# Patient Record
Sex: FEMALE | Race: WHITE | NOT HISPANIC OR LATINO | Employment: FULL TIME | URBAN - METROPOLITAN AREA
[De-identification: names, ages, dates, MRNs, and addresses within clinical notes are randomized per-mention and may not be internally consistent; named-entity substitution may affect disease eponyms.]

---

## 2019-08-16 ENCOUNTER — OFFICE VISIT (OUTPATIENT)
Dept: FAMILY MEDICINE CLINIC | Facility: CLINIC | Age: 59
End: 2019-08-16

## 2019-08-16 VITALS
BODY MASS INDEX: 26.12 KG/M2 | TEMPERATURE: 97.5 F | HEART RATE: 65 BPM | WEIGHT: 156.8 LBS | DIASTOLIC BLOOD PRESSURE: 56 MMHG | SYSTOLIC BLOOD PRESSURE: 108 MMHG | HEIGHT: 65 IN | OXYGEN SATURATION: 98 %

## 2019-08-16 DIAGNOSIS — H66.92 LEFT OTITIS MEDIA, UNSPECIFIED OTITIS MEDIA TYPE: ICD-10-CM

## 2019-08-16 DIAGNOSIS — H61.23 BILATERAL IMPACTED CERUMEN: Primary | ICD-10-CM

## 2019-08-16 PROCEDURE — 99213 OFFICE O/P EST LOW 20 MIN: CPT | Performed by: NURSE PRACTITIONER

## 2019-08-16 PROCEDURE — 69209 REMOVE IMPACTED EAR WAX UNI: CPT | Performed by: NURSE PRACTITIONER

## 2019-08-16 RX ORDER — AZITHROMYCIN 250 MG/1
TABLET, FILM COATED ORAL
Qty: 5 TABLET | Refills: 0 | Status: SHIPPED | OUTPATIENT
Start: 2019-08-16

## 2019-08-16 NOTE — PATIENT INSTRUCTIONS
Earwax Buildup, Adult  The ears produce a substance called earwax that helps keep bacteria out of the ear and protects the skin in the ear canal. Occasionally, earwax can build up in the ear and cause discomfort or hearing loss.  What increases the risk?  This condition is more likely to develop in people who:  · Are male.  · Are elderly.  · Naturally produce more earwax.  · Clean their ears often with cotton swabs.  · Use earplugs often.  · Use in-ear headphones often.  · Wear hearing aids.  · Have narrow ear canals.  · Have earwax that is overly thick or sticky.  · Have eczema.  · Are dehydrated.  · Have excess hair in the ear canal.  What are the signs or symptoms?  Symptoms of this condition include:  · Reduced or muffled hearing.  · A feeling of fullness in the ear or feeling that the ear is plugged.  · Fluid coming from the ear.  · Ear pain.  · Ear itch.  · Ringing in the ear.  · Coughing.  · An obvious piece of earwax that can be seen inside the ear canal.  How is this diagnosed?  This condition may be diagnosed based on:  · Your symptoms.  · Your medical history.  · An ear exam. During the exam, your health care provider will look into your ear with an instrument called an otoscope.  You may have tests, including a hearing test.  How is this treated?  This condition may be treated by:  · Using ear drops to soften the earwax.  · Having the earwax removed by a health care provider. The health care provider may:  ? Flush the ear with water.  ? Use an instrument that has a loop on the end (curette).  ? Use a suction device.  · Surgery to remove the wax buildup. This may be done in severe cases.  Follow these instructions at home:    · Take over-the-counter and prescription medicines only as told by your health care provider.  · Do not put any objects, including cotton swabs, into your ear. You can clean the opening of your ear canal with a washcloth or facial tissue.  · Follow instructions from your health care  provider about cleaning your ears. Do not over-clean your ears.  · Drink enough fluid to keep your urine clear or pale yellow. This will help to thin the earwax.  · Keep all follow-up visits as told by your health care provider. If earwax builds up in your ears often or if you use hearing aids, consider seeing your health care provider for routine, preventive ear cleanings. Ask your health care provider how often you should schedule your cleanings.  · If you have hearing aids, clean them according to instructions from the  and your health care provider.  Contact a health care provider if:  · You have ear pain.  · You develop a fever.  · You have blood, pus, or other fluid coming from your ear.  · You have hearing loss.  · You have ringing in your ears that does not go away.  · Your symptoms do not improve with treatment.  · You feel like the room is spinning (vertigo).  Summary  · Earwax can build up in the ear and cause discomfort or hearing loss.  · The most common symptoms of this condition include reduced or muffled hearing and a feeling of fullness in the ear or feeling that the ear is plugged.  · This condition may be diagnosed based on your symptoms, your medical history, and an ear exam.  · This condition may be treated by using ear drops to soften the earwax or by having the earwax removed by a health care provider.  · Do not put any objects, including cotton swabs, into your ear. You can clean the opening of your ear canal with a washcloth or facial tissue.  This information is not intended to replace advice given to you by your health care provider. Make sure you discuss any questions you have with your health care provider.  Document Released: 01/25/2006 Document Revised: 11/29/2018 Document Reviewed: 02/28/2018  [employer] Interactive Patient Education © 2019 [employer]

## 2019-08-16 NOTE — PROGRESS NOTES
"Gray Bundy is a 58 y.o. female.     Chief Complaint   Patient presents with   • Earache     Left ear pain for 10 days        History of Present Illness   Patient is here today with c/o L ear pain for last 10 days or so.  Tried OTC aleve and ear drops with no relief.    Pain comes and goes sporadically.  Doesn't wake her up at night.      The following portions of the patient's history were reviewed and updated as appropriate: allergies, current medications, past family history, past medical history, past social history, past surgical history and problem list.    History reviewed. No pertinent past medical history.    Past Surgical History:   Procedure Laterality Date   • HYSTERECTOMY         Family History   Problem Relation Age of Onset   • Diabetes Mother    • Clotting disorder Mother        Social History     Socioeconomic History   • Marital status:      Spouse name: Not on file   • Number of children: Not on file   • Years of education: Not on file   • Highest education level: Not on file   Tobacco Use   • Smoking status: Never Smoker   • Smokeless tobacco: Never Used   Substance and Sexual Activity   • Alcohol use: No   • Drug use: No   • Sexual activity: Yes     Partners: Male       Review of Systems   Constitutional: Negative for fatigue and fever.   HENT: Positive for ear discharge and ear pain. Negative for postnasal drip, rhinorrhea, sinus pressure and sore throat.    Respiratory: Negative for cough, shortness of breath and wheezing.    Cardiovascular: Negative for chest pain.       Objective   Vitals:    08/16/19 0921   BP: 108/56   Pulse: 65   Temp: 97.5 °F (36.4 °C)   SpO2: 98%   Weight: 71.1 kg (156 lb 12.8 oz)   Height: 165.1 cm (65\")     Body mass index is 26.09 kg/m².  Physical Exam   Constitutional: She appears well-developed and well-nourished.   HENT:   Head: Normocephalic.   Right Ear: Tympanic membrane, external ear and ear canal normal. cerumen impaction is " present.  Left Ear: An impacted cerumen is present.  Nose: Rhinorrhea present. No sinus tenderness. Right sinus exhibits no maxillary sinus tenderness and no frontal sinus tenderness. Left sinus exhibits no maxillary sinus tenderness and no frontal sinus tenderness.   Mouth/Throat: Uvula is midline, oropharynx is clear and moist and mucous membranes are normal.   Cardiovascular: Normal rate and regular rhythm.   Pulmonary/Chest: Effort normal and breath sounds normal.     Ear Cerumen Removal  Date/Time: 8/16/2019 9:53 AM  Performed by: Emanuel Miller APRN  Authorized by: Emanuel Miller APRN     Anesthesia:  Local Anesthetic: none  Location details: left ear and right ear  Patient tolerance: Patient tolerated the procedure well with no immediate complications  Comments: Post procedure reveals R ear TM normal, but L ear erythema, bulging, and loss of light reflex.    Procedure type: irrigation   Sedation:  Patient sedated: no            Assessment/Plan   Loreto was seen today for earache.    Diagnoses and all orders for this visit:    Bilateral impacted cerumen  -     Ear Cerumen Removal    Left otitis media, unspecified otitis media type    Other orders  -     azithromycin (ZITHROMAX) 250 MG tablet; Take 2 tablets the first day, then 1 tablet daily for 4 days.                 Patient Instructions   Earwax Buildup, Adult  The ears produce a substance called earwax that helps keep bacteria out of the ear and protects the skin in the ear canal. Occasionally, earwax can build up in the ear and cause discomfort or hearing loss.  What increases the risk?  This condition is more likely to develop in people who:  · Are male.  · Are elderly.  · Naturally produce more earwax.  · Clean their ears often with cotton swabs.  · Use earplugs often.  · Use in-ear headphones often.  · Wear hearing aids.  · Have narrow ear canals.  · Have earwax that is overly thick or sticky.  · Have eczema.  · Are dehydrated.  · Have excess hair  in the ear canal.  What are the signs or symptoms?  Symptoms of this condition include:  · Reduced or muffled hearing.  · A feeling of fullness in the ear or feeling that the ear is plugged.  · Fluid coming from the ear.  · Ear pain.  · Ear itch.  · Ringing in the ear.  · Coughing.  · An obvious piece of earwax that can be seen inside the ear canal.  How is this diagnosed?  This condition may be diagnosed based on:  · Your symptoms.  · Your medical history.  · An ear exam. During the exam, your health care provider will look into your ear with an instrument called an otoscope.  You may have tests, including a hearing test.  How is this treated?  This condition may be treated by:  · Using ear drops to soften the earwax.  · Having the earwax removed by a health care provider. The health care provider may:  ? Flush the ear with water.  ? Use an instrument that has a loop on the end (curette).  ? Use a suction device.  · Surgery to remove the wax buildup. This may be done in severe cases.  Follow these instructions at home:    · Take over-the-counter and prescription medicines only as told by your health care provider.  · Do not put any objects, including cotton swabs, into your ear. You can clean the opening of your ear canal with a washcloth or facial tissue.  · Follow instructions from your health care provider about cleaning your ears. Do not over-clean your ears.  · Drink enough fluid to keep your urine clear or pale yellow. This will help to thin the earwax.  · Keep all follow-up visits as told by your health care provider. If earwax builds up in your ears often or if you use hearing aids, consider seeing your health care provider for routine, preventive ear cleanings. Ask your health care provider how often you should schedule your cleanings.  · If you have hearing aids, clean them according to instructions from the  and your health care provider.  Contact a health care provider if:  · You have ear  pain.  · You develop a fever.  · You have blood, pus, or other fluid coming from your ear.  · You have hearing loss.  · You have ringing in your ears that does not go away.  · Your symptoms do not improve with treatment.  · You feel like the room is spinning (vertigo).  Summary  · Earwax can build up in the ear and cause discomfort or hearing loss.  · The most common symptoms of this condition include reduced or muffled hearing and a feeling of fullness in the ear or feeling that the ear is plugged.  · This condition may be diagnosed based on your symptoms, your medical history, and an ear exam.  · This condition may be treated by using ear drops to soften the earwax or by having the earwax removed by a health care provider.  · Do not put any objects, including cotton swabs, into your ear. You can clean the opening of your ear canal with a washcloth or facial tissue.  This information is not intended to replace advice given to you by your health care provider. Make sure you discuss any questions you have with your health care provider.  Document Released: 01/25/2006 Document Revised: 11/29/2018 Document Reviewed: 02/28/2018  [employer] Interactive Patient Education © 2019 [employer]

## 2024-05-31 ENCOUNTER — OFFICE VISIT (OUTPATIENT)
Dept: GASTROENTEROLOGY | Facility: CLINIC | Age: 64
End: 2024-05-31
Payer: COMMERCIAL

## 2024-05-31 VITALS
WEIGHT: 168.2 LBS | BODY MASS INDEX: 28.02 KG/M2 | DIASTOLIC BLOOD PRESSURE: 82 MMHG | HEIGHT: 65 IN | SYSTOLIC BLOOD PRESSURE: 124 MMHG

## 2024-05-31 DIAGNOSIS — R10.13 POSTPRANDIAL EPIGASTRIC PAIN: ICD-10-CM

## 2024-05-31 DIAGNOSIS — Z12.11 ENCOUNTER FOR SCREENING FOR MALIGNANT NEOPLASM OF COLON: Primary | ICD-10-CM

## 2024-05-31 RX ORDER — NAPROXEN SODIUM 220 MG
220 TABLET ORAL AS NEEDED
COMMUNITY

## 2024-05-31 RX ORDER — PANTOPRAZOLE SODIUM 40 MG/1
40 TABLET, DELAYED RELEASE ORAL DAILY
COMMUNITY
Start: 2024-05-10

## 2024-05-31 RX ORDER — LOSARTAN POTASSIUM 25 MG/1
25 TABLET ORAL 2 TIMES DAILY
COMMUNITY
Start: 2024-05-16

## 2024-05-31 NOTE — PROGRESS NOTES
"    PATIENT INFORMATION  Loreto Bundy       - 1960    CHIEF COMPLAINT  Chief Complaint   Patient presents with    Heartburn       HISTORY OF PRESENT ILLNESS  Here today for evaluation of GERD    40 mg pantoprazole daily. Reflux for 6-8 months, was having difficulty swallowing, resolved completely with pantoprazole. Still having bloating and pain epigastric and both sides after eating with every meal, sometimes gas can give some relief. Pepto gives some relief. No nausea, vomiting, odynophagia. Aleve maybe once or twice a month.    Bowels are moving daily, having hard stools, not taking anything for bowels, taking fiber gummy. No rectal pain or bleeding. Reviewed water, fiber, exercise. Switched from [employer] to  in January and not moving as much.    Last colon 2013 with polypectomy and no path.        REVIEWED PERTINENT RESULTS/ LABS  No results found for: \"CASEREPORT\", \"FINALDX\"  No results found for: \"HGB\", \"MCV\", \"PLT\", \"ALT\", \"AST\", \"HGBA1C\", \"GFR\", \"INR\", \"TRIG\", \"FERRITIN\", \"IRON\", \"TIBC\"   No results found.    REVIEW OF SYSTEMS  Review of Systems   Constitutional:  Negative for activity change, chills, fever and unexpected weight change.   HENT:  Negative for congestion.    Eyes:  Negative for visual disturbance.   Respiratory:  Negative for shortness of breath.    Cardiovascular:  Negative for chest pain and palpitations.   Gastrointestinal:  Positive for abdominal distention (post prandial), abdominal pain (post prandial) and constipation. Negative for blood in stool.   Endocrine: Negative for cold intolerance and heat intolerance.   Genitourinary:  Negative for hematuria.   Musculoskeletal:  Negative for gait problem.   Skin:  Negative for color change.   Allergic/Immunologic: Negative for immunocompromised state.   Neurological:  Negative for weakness and light-headedness.   Hematological:  Negative for adenopathy.   Psychiatric/Behavioral:  Negative for sleep disturbance. The patient " "is not nervous/anxious.          ACTIVE PROBLEMS  There are no problems to display for this patient.        PAST MEDICAL HISTORY  History reviewed. No pertinent past medical history.      SURGICAL HISTORY  Past Surgical History:   Procedure Laterality Date    HYSTERECTOMY           FAMILY HISTORY  Family History   Problem Relation Age of Onset    Diabetes Mother     Clotting disorder Mother          SOCIAL HISTORY  Social History     Occupational History    Not on file   Tobacco Use    Smoking status: Never     Passive exposure: Never    Smokeless tobacco: Never    Tobacco comments:     NA   Vaping Use    Vaping status: Never Used   Substance and Sexual Activity    Alcohol use: No    Drug use: No    Sexual activity: Yes     Partners: Male         CURRENT MEDICATIONS    Current Outpatient Medications:     losartan (COZAAR) 25 MG tablet, Take 1 tablet by mouth 2 (Two) Times a Day., Disp: , Rfl:     naproxen sodium (ALEVE) 220 MG tablet, Take 1 tablet by mouth As Needed., Disp: , Rfl:     pantoprazole (PROTONIX) 40 MG EC tablet, Take 1 tablet by mouth Daily., Disp: , Rfl:     ALLERGIES  Penicillins    VITALS  Vitals:    05/31/24 0924   BP: 124/82   BP Location: Left arm   Patient Position: Sitting   Cuff Size: Adult   Weight: 76.3 kg (168 lb 3.2 oz)   Height: 165.1 cm (65\")       PHYSICAL EXAM  Debilities/Disabilities Identified: None  Emotional Behavior: Appropriate  Wt Readings from Last 3 Encounters:   05/31/24 76.3 kg (168 lb 3.2 oz)   08/16/19 71.1 kg (156 lb 12.8 oz)   05/31/19 69.9 kg (154 lb)     Ht Readings from Last 1 Encounters:   05/31/24 165.1 cm (65\")     Body mass index is 27.99 kg/m².  Physical Exam  Constitutional:       General: She is not in acute distress.     Appearance: Normal appearance. She is not ill-appearing.   HENT:      Head: Normocephalic and atraumatic.      Mouth/Throat:      Mouth: Mucous membranes are moist.      Pharynx: No posterior oropharyngeal erythema.   Eyes:      General: No " scleral icterus.  Cardiovascular:      Rate and Rhythm: Normal rate and regular rhythm.      Heart sounds: Normal heart sounds.   Pulmonary:      Effort: Pulmonary effort is normal.      Breath sounds: Normal breath sounds.   Abdominal:      General: Abdomen is flat. Bowel sounds are normal. There is no distension.      Palpations: Abdomen is soft. There is no mass.      Tenderness: There is no abdominal tenderness in the right upper quadrant and right lower quadrant. There is no guarding or rebound. Negative signs include Bashir's sign.      Hernia: No hernia is present.   Musculoskeletal:      Cervical back: Neck supple.   Skin:     General: Skin is warm.      Capillary Refill: Capillary refill takes less than 2 seconds.   Neurological:      General: No focal deficit present.      Mental Status: She is alert and oriented to person, place, and time.   Psychiatric:         Mood and Affect: Mood normal.         Behavior: Behavior normal.         Thought Content: Thought content normal.         Judgment: Judgment normal.         CLINICAL DATA REVIEWED   reviewed previous lab results and integrated with today's visit, reviewed notes from other physicians and/or last GI encounter, reviewed previous endoscopy results and available photos, reviewed surgical pathology results from previous biopsies    ASSESSMENT  Diagnoses and all orders for this visit:    Encounter for screening for malignant neoplasm of colon  -     Case Request; Standing  -     Case Request    Postprandial epigastric pain  -     Case Request; Standing  -     Case Request  -     H. Pylori Antigen, Stool - Stool, Per Rectum    Other orders  -     losartan (COZAAR) 25 MG tablet; Take 1 tablet by mouth 2 (Two) Times a Day.  -     naproxen sodium (ALEVE) 220 MG tablet; Take 1 tablet by mouth As Needed.  -     pantoprazole (PROTONIX) 40 MG EC tablet; Take 1 tablet by mouth Daily.  -     Follow Anesthesia Guidelines / Protocol; Future  -     Obtain Informed  Consent; Standing          PLAN    HP stool  EGD and Colon, call if still difficulty with constipation leading up to colon  Start miralax every night  Use PRN AA, avoid pepto  Water, fiber, exercise for bowels    No follow-ups on file.    I have discussed the above plan with the patient.  They verbalize understanding and are in agreement with the plan.  They have been advised to contact the office for any questions, concerns, or changes related to their health.

## 2024-06-06 ENCOUNTER — LAB (OUTPATIENT)
Dept: LAB | Facility: HOSPITAL | Age: 64
End: 2024-06-06
Payer: COMMERCIAL

## 2024-06-07 PROCEDURE — 87338 HPYLORI STOOL AG IA: CPT

## 2024-06-09 LAB — H PYLORI AG STL QL IA: NEGATIVE

## 2024-06-12 ENCOUNTER — TELEPHONE (OUTPATIENT)
Dept: GASTROENTEROLOGY | Facility: CLINIC | Age: 64
End: 2024-06-12
Payer: COMMERCIAL

## 2024-07-11 ENCOUNTER — ANESTHESIA EVENT (OUTPATIENT)
Dept: PERIOP | Facility: HOSPITAL | Age: 64
End: 2024-07-11
Payer: COMMERCIAL

## 2024-07-12 ENCOUNTER — HOSPITAL ENCOUNTER (OUTPATIENT)
Facility: HOSPITAL | Age: 64
Setting detail: HOSPITAL OUTPATIENT SURGERY
Discharge: HOME | End: 2024-07-12
Attending: INTERNAL MEDICINE | Admitting: INTERNAL MEDICINE
Payer: COMMERCIAL

## 2024-07-12 ENCOUNTER — ANESTHESIA (OUTPATIENT)
Dept: PERIOP | Facility: HOSPITAL | Age: 64
End: 2024-07-12
Payer: COMMERCIAL

## 2024-07-12 VITALS
TEMPERATURE: 97.4 F | WEIGHT: 168.6 LBS | HEART RATE: 59 BPM | SYSTOLIC BLOOD PRESSURE: 136 MMHG | DIASTOLIC BLOOD PRESSURE: 77 MMHG | OXYGEN SATURATION: 91 % | RESPIRATION RATE: 16 BRPM | BODY MASS INDEX: 28.06 KG/M2

## 2024-07-12 DIAGNOSIS — R10.13 POSTPRANDIAL EPIGASTRIC PAIN: ICD-10-CM

## 2024-07-12 DIAGNOSIS — Z12.11 ENCOUNTER FOR SCREENING FOR MALIGNANT NEOPLASM OF COLON: ICD-10-CM

## 2024-07-12 PROCEDURE — 25010000002 PROPOFOL 200 MG/20ML EMULSION: Performed by: NURSE ANESTHETIST, CERTIFIED REGISTERED

## 2024-07-12 PROCEDURE — 43239 EGD BIOPSY SINGLE/MULTIPLE: CPT | Performed by: INTERNAL MEDICINE

## 2024-07-12 PROCEDURE — 25810000003 LACTATED RINGERS PER 1000 ML: Performed by: NURSE ANESTHETIST, CERTIFIED REGISTERED

## 2024-07-12 PROCEDURE — 43249 ESOPH EGD DILATION <30 MM: CPT | Performed by: INTERNAL MEDICINE

## 2024-07-12 PROCEDURE — 45378 DIAGNOSTIC COLONOSCOPY: CPT | Performed by: INTERNAL MEDICINE

## 2024-07-12 PROCEDURE — 88305 TISSUE EXAM BY PATHOLOGIST: CPT | Performed by: INTERNAL MEDICINE

## 2024-07-12 PROCEDURE — C1726 CATH, BAL DIL, NON-VASCULAR: HCPCS | Performed by: INTERNAL MEDICINE

## 2024-07-12 RX ORDER — LIDOCAINE HYDROCHLORIDE 20 MG/ML
INJECTION, SOLUTION INFILTRATION; PERINEURAL AS NEEDED
Status: DISCONTINUED | OUTPATIENT
Start: 2024-07-12 | End: 2024-07-12 | Stop reason: SURG

## 2024-07-12 RX ORDER — SODIUM CHLORIDE 9 MG/ML
40 INJECTION, SOLUTION INTRAVENOUS AS NEEDED
Status: DISCONTINUED | OUTPATIENT
Start: 2024-07-12 | End: 2024-07-12 | Stop reason: HOSPADM

## 2024-07-12 RX ORDER — LIDOCAINE HYDROCHLORIDE 10 MG/ML
0.5 INJECTION, SOLUTION INFILTRATION; PERINEURAL ONCE AS NEEDED
Status: DISCONTINUED | OUTPATIENT
Start: 2024-07-12 | End: 2024-07-12 | Stop reason: HOSPADM

## 2024-07-12 RX ORDER — SODIUM CHLORIDE, SODIUM LACTATE, POTASSIUM CHLORIDE, CALCIUM CHLORIDE 600; 310; 30; 20 MG/100ML; MG/100ML; MG/100ML; MG/100ML
9 INJECTION, SOLUTION INTRAVENOUS CONTINUOUS
Status: DISCONTINUED | OUTPATIENT
Start: 2024-07-12 | End: 2024-07-12 | Stop reason: HOSPADM

## 2024-07-12 RX ORDER — SODIUM CHLORIDE 0.9 % (FLUSH) 0.9 %
10 SYRINGE (ML) INJECTION EVERY 12 HOURS SCHEDULED
Status: DISCONTINUED | OUTPATIENT
Start: 2024-07-12 | End: 2024-07-12 | Stop reason: HOSPADM

## 2024-07-12 RX ORDER — ONDANSETRON 2 MG/ML
4 INJECTION INTRAMUSCULAR; INTRAVENOUS ONCE AS NEEDED
Status: DISCONTINUED | OUTPATIENT
Start: 2024-07-12 | End: 2024-07-12 | Stop reason: HOSPADM

## 2024-07-12 RX ORDER — SODIUM CHLORIDE 0.9 % (FLUSH) 0.9 %
10 SYRINGE (ML) INJECTION AS NEEDED
Status: DISCONTINUED | OUTPATIENT
Start: 2024-07-12 | End: 2024-07-12 | Stop reason: HOSPADM

## 2024-07-12 RX ORDER — PROPOFOL 10 MG/ML
INJECTION, EMULSION INTRAVENOUS AS NEEDED
Status: DISCONTINUED | OUTPATIENT
Start: 2024-07-12 | End: 2024-07-12 | Stop reason: SURG

## 2024-07-12 RX ORDER — SODIUM CHLORIDE, SODIUM LACTATE, POTASSIUM CHLORIDE, CALCIUM CHLORIDE 600; 310; 30; 20 MG/100ML; MG/100ML; MG/100ML; MG/100ML
100 INJECTION, SOLUTION INTRAVENOUS ONCE
Status: DISCONTINUED | OUTPATIENT
Start: 2024-07-12 | End: 2024-07-12 | Stop reason: HOSPADM

## 2024-07-12 RX ADMIN — PROPOFOL 50 MG: 10 INJECTION, EMULSION INTRAVENOUS at 09:01

## 2024-07-12 RX ADMIN — SODIUM CHLORIDE, POTASSIUM CHLORIDE, SODIUM LACTATE AND CALCIUM CHLORIDE 9 ML/HR: 600; 310; 30; 20 INJECTION, SOLUTION INTRAVENOUS at 07:45

## 2024-07-12 RX ADMIN — PROPOFOL 50 MG: 10 INJECTION, EMULSION INTRAVENOUS at 08:42

## 2024-07-12 RX ADMIN — PROPOFOL 50 MG: 10 INJECTION, EMULSION INTRAVENOUS at 08:39

## 2024-07-12 RX ADMIN — LIDOCAINE HYDROCHLORIDE 100 MG: 20 INJECTION, SOLUTION INFILTRATION; PERINEURAL at 08:39

## 2024-07-12 RX ADMIN — PROPOFOL 50 MG: 10 INJECTION, EMULSION INTRAVENOUS at 08:57

## 2024-07-12 RX ADMIN — PROPOFOL 50 MG: 10 INJECTION, EMULSION INTRAVENOUS at 09:13

## 2024-07-12 RX ADMIN — PROPOFOL 50 MG: 10 INJECTION, EMULSION INTRAVENOUS at 08:46

## 2024-07-12 RX ADMIN — PROPOFOL 50 MG: 10 INJECTION, EMULSION INTRAVENOUS at 08:54

## 2024-07-12 RX ADMIN — PROPOFOL 50 MG: 10 INJECTION, EMULSION INTRAVENOUS at 08:50

## 2024-07-12 RX ADMIN — PROPOFOL 50 MG: 10 INJECTION, EMULSION INTRAVENOUS at 09:16

## 2024-07-12 RX ADMIN — PROPOFOL 50 MG: 10 INJECTION, EMULSION INTRAVENOUS at 09:05

## 2024-07-12 RX ADMIN — PROPOFOL 50 MG: 10 INJECTION, EMULSION INTRAVENOUS at 09:09

## 2024-07-12 NOTE — H&P
Patient Care Team:  Provider, No Known as PCP - General    CHIEF COMPLAINT: Screening CRC and dyspepsia    HISTORY OF PRESENT ILLNESS:  Last Colon 2013    Past Medical History:   Diagnosis Date    GERD (gastroesophageal reflux disease)     Hypertension      Past Surgical History:   Procedure Laterality Date    HYSTERECTOMY      OTHER SURGICAL HISTORY      bone surgery     Family History   Problem Relation Age of Onset    Diabetes Mother     Clotting disorder Mother     Malig Hyperthermia Neg Hx      Social History     Tobacco Use    Smoking status: Never     Passive exposure: Never    Smokeless tobacco: Never    Tobacco comments:     NA   Vaping Use    Vaping status: Never Used   Substance Use Topics    Alcohol use: No    Drug use: No     Medications Prior to Admission   Medication Sig Dispense Refill Last Dose    losartan (COZAAR) 25 MG tablet Take 1 tablet by mouth 2 (Two) Times a Day.   7/10/2024    pantoprazole (PROTONIX) 40 MG EC tablet Take 1 tablet by mouth Daily.   7/10/2024    naproxen sodium (ALEVE) 220 MG tablet Take 1 tablet by mouth As Needed.   More than a month     Allergies:  Penicillins    REVIEW OF SYSTEMS:  Please see the above history of present illness for pertinent positives and negatives.  The remainder of the patient's systems have been reviewed and are negative.     Vital Signs  Temp:  [97.7 °F (36.5 °C)] 97.7 °F (36.5 °C)  Heart Rate:  [62] 62  Resp:  [16] 16  BP: (146)/(65) 146/65    Flowsheet Rows      Flowsheet Row First Filed Value   Admission Height --   Admission Weight 76.5 kg (168 lb 9.6 oz) Documented at 07/12/2024 0730             Physical Exam:  Physical Exam   Constitutional: Patient appears well-developed and well-nourished and in no acute distress   HEENT:   Head: Normocephalic and atraumatic.   Eyes:  Pupils are equal, round, and reactive to light. EOM are intact. Sclerae are anicteric and non-injected.  Mouth and Throat: Patient has moist mucous membranes. Oropharynx is  clear of any erythema or exudate.     Neck: Neck supple. No JVD present. No thyromegaly present. No lymphadenopathy present.  Cardiovascular: Regular rate, regular rhythm, S1 normal and S2 normal.  Exam reveals no gallop and no friction rub.  No murmur heard.  Pulmonary/Chest: Lungs are clear to auscultation bilaterally. No respiratory distress. No wheezes. No rhonchi. No rales.   Abdominal: Soft. Bowel sounds are normal. No distension and no mass. There is no hepatosplenomegaly. There is no tenderness.   Musculoskeletal: Normal Muscle tone  Extremities: No edema. Pulses are palpable in all 4 extremities.  Neurological: Patient is alert and oriented to person, place, and time. Cranial nerves II-XII are grossly intact with no focal deficits.  Skin: Skin is warm. No rash noted. Nails show no clubbing.  No cyanosis or erythema.    Debilities/Disabilities Identified: None  Emotional Behavior: Appropriate     Results Review:   I reviewed the patient's new clinical results.    Lab Results (most recent)       None            Imaging Results (Most Recent)       None          reviewed    ECG/EMG Results (most recent)       None          reviewed    Assessment & Plan   Screening CRC and dyspepsia/  EGD and colonoscopy      I discussed the patient's findings and my recommendations with patient.     Eliseo Moya MD  07/12/24  08:25 EDT    Time: 10 min prior to procedure.

## 2024-07-12 NOTE — ANESTHESIA PREPROCEDURE EVALUATION
Anesthesia Evaluation     Patient summary reviewed and Nursing notes reviewed   no history of anesthetic complications:   NPO Solid Status: > 8 hours  NPO Liquid Status: > 8 hours           Airway   Mallampati: II  TM distance: <3 FB  Neck ROM: full  No difficulty expected  Dental          Pulmonary - negative pulmonary ROS and normal exam    breath sounds clear to auscultation  Cardiovascular - normal exam  Exercise tolerance: good (4-7 METS)    Rhythm: regular  Rate: normal    (+) hypertension well controlled less than 2 medications      Neuro/Psych- negative ROS  GI/Hepatic/Renal/Endo    (+) GERD well controlled    Musculoskeletal (-) negative ROS    Abdominal     Abdomen: soft.  Bowel sounds: normal.   Substance History - negative use     OB/GYN negative ob/gyn ROS         Other - negative ROS                         Anesthesia Plan    ASA 2     MAC     intravenous induction     Anesthetic plan, risks, benefits, and alternatives have been provided, discussed and informed consent has been obtained with: patient and spouse/significant other.  Pre-procedure education provided  Use of blood products discussed with patient and spouse/significant other  Consented to blood products.    Plan discussed with CRNA.        CODE STATUS:

## 2024-07-12 NOTE — DISCHARGE INSTRUCTIONS
Colonoscopy, Adult, Care After  This sheet gives you information about how to care for yourself after your procedure. Your doctor may also give you more specific instructions. If you have problems or questions, call your doctor.  What can I expect after the procedure?  After the procedure, it is common to have:  A small amount of blood in your poop for 24 hours.  Some gas.  Mild cramping or bloating in your belly.  Follow these instructions at home:  General instructions    ***************DO NOT DRIVE TODAY*******************    For the first 24 hours after the procedure:  Do not sign important documents.  Do not drink alcohol.  Do your daily activities more slowly than normal.  Eat foods that are soft and easy to digest.  Take over-the-counter or prescription medicines only as told by your doctor.  To help cramping and bloating:       Try walking around.  Put heat on your belly (abdomen) as told by your doctor. Use a heat source that your doctor recommends, such as a moist heat pack or a heating pad.  Put a towel between your skin and the heat source.  Leave the heat on for 20-30 minutes.  Remove the heat if your skin turns bright red. This is especially important if you cannot feel pain, heat, or cold. You can get burned.  Eating and drinking  Upper Endoscopy, Adult, Care After  This sheet gives you information about how to care for yourself after your procedure. Your health care provider may also give you more specific instructions. If you have problems or questions, contact your health care provider.  What can I expect after the procedure?  After the procedure, it is common to have:  A sore throat.  Mild stomach pain or discomfort.  Bloating.  Nausea.  Follow these instructions at home:       Follow instructions from your health care provider about what to eat or drink after your procedure.  Return to your normal activities as told by your health care provider. Ask your health care provider what activities are  safe for you.  Take over-the-counter and prescription medicines only as told by your health care provider.  DO NOT DRIVE FOR THE REST OF TODAY if you were given a sedative during your procedure.  Keep all follow-up visits as told by your health care provider. This is important.  Contact a health care provider if you have:  A sore throat that lasts longer than one day.  Trouble swallowing.  Get help right away if:  You vomit blood or your vomit looks like coffee grounds.  You have:  A fever.  Bloody, black, or tarry stools.  A severe sore throat or you cannot swallow.  Difficulty breathing.  Severe pain in your chest or abdomen.  Summary  After the procedure, it is common to have a sore throat, mild stomach discomfort, bloating, and nausea.  Do not drive TODAY if you were given a sedative during the procedure.  Follow instructions from your health care provider about what to eat or drink after your procedure.  Return to your normal activities as told by your health care provider.  This information is not intended to replace advice given to you by your health care provider. Make sure you discuss any questions you have with your health care provider.  Document Released: 06/18/2013 Document Revised: 05/20/2019 Document Reviewed: 05/20/2019  [employer] Interactive Patient Education © 2019 [employer]   Upper Endoscopy, Adult, Care After  This sheet gives you information about how to care for yourself after your procedure. Your health care provider may also give you more specific instructions. If you have problems or questions, contact your health care provider.  What can I expect after the procedure?  After the procedure, it is common to have:  A sore throat.  Mild stomach pain or discomfort.  Bloating.  Nausea.  Follow these instructions at home:       Follow instructions from your health care provider about what to eat or drink after your procedure.  Return to your normal activities as told by your health care  provider. Ask your health care provider what activities are safe for you.  Take over-the-counter and prescription medicines only as told by your health care provider.  DO NOT DRIVE FOR THE REST OF TODAY if you were given a sedative during your procedure.  Keep all follow-up visits as told by your health care provider. This is important.  Contact a health care provider if you have:  A sore throat that lasts longer than one day.  Trouble swallowing.  Get help right away if:  You vomit blood or your vomit looks like coffee grounds.  You have:  A fever.  Bloody, black, or tarry stools.  A severe sore throat or you cannot swallow.  Difficulty breathing.  Severe pain in your chest or abdomen.  Summary  After the procedure, it is common to have a sore throat, mild stomach discomfort, bloating, and nausea.  Do not drive TODAY if you were given a sedative during the procedure.  Follow instructions from your health care provider about what to eat or drink after your procedure.  Return to your normal activities as told by your health care provider.  This information is not intended to replace advice given to you by your health care provider. Make sure you discuss any questions you have with your health care provider.  Document Released: 06/18/2013 Document Revised: 05/20/2019 Document Reviewed: 05/20/2019  [employer] Interactive Patient Education © 2019 [employer]

## 2024-07-12 NOTE — ANESTHESIA POSTPROCEDURE EVALUATION
Patient: Loreto Bundy    Procedure Summary       Date: 07/12/24 Room / Location: [facility] ENDOSCOPY 1 /  [facility] OR    Anesthesia Start: 0836 Anesthesia Stop: 0922    Procedures:       ESOPHAGOGASTRODUODENOSCOPY WITH BIOPSY, dilatation (Esophagus)      COLONOSCOPY Diagnosis:       Encounter for screening for malignant neoplasm of colon      Postprandial epigastric pain      Hiatal hernia      Esophagitis      Gastritis      Diverticulosis      (Encounter for screening for malignant neoplasm of colon [Z12.11])      (Postprandial epigastric pain [R10.13])    Surgeons: Eliseo Moya MD Provider: Ladi Felton CRNA    Anesthesia Type: MAC ASA Status: 2            Anesthesia Type: MAC    Vitals  Vitals Value Taken Time   /77 07/12/24 0940   Temp 97.4 °F (36.3 °C) 07/12/24 0923   Pulse 61 07/12/24 0942   Resp 16 07/12/24 0940   SpO2 99 % 07/12/24 0942   Vitals shown include unfiled device data.        Post Anesthesia Care and Evaluation    Patient location during evaluation: bedside  Patient participation: complete - patient participated  Level of consciousness: awake and alert  Pain score: 0  Pain management: adequate    Airway patency: patent  Anesthetic complications: No anesthetic complications  PONV Status: none  Cardiovascular status: acceptable  Respiratory status: acceptable  Hydration status: acceptable

## 2024-07-12 NOTE — BRIEF OP NOTE
ESOPHAGOGASTRODUODENOSCOPY WITH BIOPSY, COLONOSCOPY  Progress Note    Loreto Bundy  7/12/2024    Pre-op Diagnosis:   Encounter for screening for malignant neoplasm of colon [Z12.11]  Postprandial epigastric pain [R10.13]       Post-Op Diagnosis Codes:     * Encounter for screening for malignant neoplasm of colon [Z12.11]     * Postprandial epigastric pain [R10.13]     * Hiatal hernia [K44.9]     * Esophagitis [530.1]     * Gastritis [K29.70]     * Diverticulosis [K57.90]    Procedure/CPT® Codes:        Procedure(s):  ESOPHAGOGASTRODUODENOSCOPY WITH BIOPSY, dilatation  COLONOSCOPY              Surgeon(s):  Eliseo Moya MD    Anesthesia: Monitored Anesthesia Care    Staff:   Circulator: Ricardo Lozada RN  Scrub Person: Araceli Hollis MA; Marianela Agosto         Estimated Blood Loss: none    Urine Voided: * No values recorded between 7/12/2024  8:36 AM and 7/12/2024  9:22 AM *    Specimens:                Specimens       ID Source Type Tests Collected By Collected At Frozen?    A Stomach Tissue TISSUE PATHOLOGY EXAM   Eliseo Moya MD 7/12/24 0852     Description: gastric biopsy    B Esophagus, Distal Tissue TISSUE PATHOLOGY EXAM   Eliseo Moya MD 7/12/24 0857 No    Description: Distal Esophageal Biopsy                  Drains: * No LDAs found *    Findings: Normal Duodenum  Mild Gastritis-Biopsy  Hiatal Hernia  Non-Stricturing Esophageal Ring-Dilated-No Stricture Biopsy    Colon to cecum Good Prep  Sigmoid Diverticulosis        Complications: none          Eliseo Moya MD     Date: 7/12/2024  Time: 09:24 EDT

## 2024-07-15 ENCOUNTER — TELEPHONE (OUTPATIENT)
Dept: GASTROENTEROLOGY | Facility: CLINIC | Age: 64
End: 2024-07-15
Payer: COMMERCIAL

## 2024-07-15 LAB
LAB AP CASE REPORT: NORMAL
PATH REPORT.FINAL DX SPEC: NORMAL
PATH REPORT.GROSS SPEC: NORMAL

## 2024-07-15 NOTE — TELEPHONE ENCOUNTER
----- Message from Eliseo Moya sent at 7/12/2024  9:28 AM EDT -----  Normal Colon recall in 10 years

## 2024-08-23 ENCOUNTER — OFFICE VISIT (OUTPATIENT)
Dept: GASTROENTEROLOGY | Facility: CLINIC | Age: 64
End: 2024-08-23
Payer: COMMERCIAL

## 2024-08-23 ENCOUNTER — LAB (OUTPATIENT)
Dept: LAB | Facility: HOSPITAL | Age: 64
End: 2024-08-23
Payer: COMMERCIAL

## 2024-08-23 ENCOUNTER — TELEPHONE (OUTPATIENT)
Dept: GASTROENTEROLOGY | Facility: CLINIC | Age: 64
End: 2024-08-23

## 2024-08-23 VITALS
DIASTOLIC BLOOD PRESSURE: 80 MMHG | WEIGHT: 173 LBS | BODY MASS INDEX: 28.82 KG/M2 | SYSTOLIC BLOOD PRESSURE: 120 MMHG | HEIGHT: 65 IN

## 2024-08-23 DIAGNOSIS — R79.89 ELEVATED LFTS: ICD-10-CM

## 2024-08-23 DIAGNOSIS — R10.84 GENERALIZED ABDOMINAL PAIN: ICD-10-CM

## 2024-08-23 DIAGNOSIS — K21.00 GASTROESOPHAGEAL REFLUX DISEASE WITH ESOPHAGITIS, UNSPECIFIED WHETHER HEMORRHAGE: Primary | ICD-10-CM

## 2024-08-23 DIAGNOSIS — Z87.19 HISTORY OF ESOPHAGEAL STRICTURE: ICD-10-CM

## 2024-08-23 DIAGNOSIS — R14.0 BLOATING: ICD-10-CM

## 2024-08-23 DIAGNOSIS — R74.01 ELEVATED AST (SGOT): Primary | ICD-10-CM

## 2024-08-23 LAB
ALBUMIN SERPL-MCNC: 4.5 G/DL (ref 3.5–5.2)
ALBUMIN/GLOB SERPL: 1.3 G/DL
ALP SERPL-CCNC: 92 U/L (ref 39–117)
ALT SERPL W P-5'-P-CCNC: 26 U/L (ref 1–33)
ANION GAP SERPL CALCULATED.3IONS-SCNC: 10.9 MMOL/L (ref 5–15)
AST SERPL-CCNC: 39 U/L (ref 1–32)
BILIRUB SERPL-MCNC: 0.3 MG/DL (ref 0–1.2)
BUN SERPL-MCNC: 15 MG/DL (ref 8–23)
BUN/CREAT SERPL: 20.3 (ref 7–25)
CALCIUM SPEC-SCNC: 9.4 MG/DL (ref 8.6–10.5)
CHLORIDE SERPL-SCNC: 104 MMOL/L (ref 98–107)
CO2 SERPL-SCNC: 24.1 MMOL/L (ref 22–29)
CREAT SERPL-MCNC: 0.74 MG/DL (ref 0.57–1)
EGFRCR SERPLBLD CKD-EPI 2021: 92.2 ML/MIN/1.73
GGT SERPL-CCNC: 35 U/L (ref 5–36)
GLOBULIN UR ELPH-MCNC: 3.5 GM/DL
GLUCOSE SERPL-MCNC: 104 MG/DL (ref 65–99)
POTASSIUM SERPL-SCNC: 4.4 MMOL/L (ref 3.5–5.2)
PROT SERPL-MCNC: 8 G/DL (ref 6–8.5)
SODIUM SERPL-SCNC: 139 MMOL/L (ref 136–145)

## 2024-08-23 PROCEDURE — 82977 ASSAY OF GGT: CPT

## 2024-08-23 PROCEDURE — 36415 COLL VENOUS BLD VENIPUNCTURE: CPT

## 2024-08-23 PROCEDURE — 80053 COMPREHEN METABOLIC PANEL: CPT

## 2024-08-23 RX ORDER — LOSARTAN POTASSIUM 50 MG/1
50 TABLET ORAL DAILY
COMMUNITY
Start: 2024-07-17

## 2024-08-23 RX ORDER — VONOPRAZAN FUMARATE 26.72 MG/1
20 TABLET ORAL DAILY
Qty: 30 TABLET | Refills: 1 | COMMUNITY
Start: 2024-08-23 | End: 2024-08-23

## 2024-08-23 RX ORDER — VONOPRAZAN FUMARATE 26.72 MG/1
20 TABLET ORAL DAILY
Qty: 30 TABLET | Refills: 1 | Status: SHIPPED | OUTPATIENT
Start: 2024-08-23

## 2024-08-23 NOTE — PROGRESS NOTES
PATIENT INFORMATION  Loreto Bundy       - 1960    CHIEF COMPLAINT  Chief Complaint   Patient presents with    Postprandial epigastric pain    Esophageal stricture    Reactive gastropathy       HISTORY OF PRESENT ILLNESS    Here today for EGD and Colonoscopy follow-up    2024 EGD and colon with stricture (dilated) mild reactive gastropathy, well-controlled reflux, no HP or barretts. Colonoscopy was normal, so 7 yr recall. Reviewed path and images with patient.     GERD: 40 mg pantoprazole daily. Bloating and discomfort 30-45 min after she eats, feels like gas build, lasts until bedtime, and usually after last meal. Not eating much breakfast or lunch. Overeating for dinner is contributing. Reviewed avoiding irritants and NSAIDs, small meals more often. This does not seem to be a gallbladder issue.    LFTs mildly elevated in March, will recheck today.        REVIEWED PERTINENT RESULTS/ LABS  Lab Results   Component Value Date    CASEREPORT  2024     Surgical Pathology Report                         Case: [id number]                                  Authorizing Provider:  Eliseo Moya        Collected:           2024 08:52 AM                                 MD Gia                                                                   Ordering Location:     [facility]   Received:            2024 09:31 AM                                 OR                                                                           Pathologist:           Cristina Bowman MD                                                          Specimens:   1) - Stomach, gastric biopsy                                                                        2) - Esophagus, Distal, Distal Esophageal Biopsy                                           FINALDX  2024     1.  Stomach, biopsy: Antral type gastric mucosa with   A. Mild reactive/chemical gastropathy; no significant inflammation.   B. No  "metaplasia, dysplasia nor malignancy.   C. No H. pylori-like organisms identified.    2.  Esophagus, distal, biopsy: squamous mucosa with    A. No significant eosinophilia.   B. No goblet cell metaplasia, dysplasia nor malignancy.   C. No viral inclusions nor fungal organisms identified.       No results found for: \"HGB\", \"MCV\", \"PLT\", \"ALT\", \"AST\", \"HGBA1C\", \"GFR\", \"INR\", \"TRIG\", \"FERRITIN\", \"IRON\", \"TIBC\"   No results found.    REVIEW OF SYSTEMS  Review of Systems   Constitutional:  Negative for activity change, chills, fever and unexpected weight change.   HENT:  Negative for congestion.    Eyes:  Negative for visual disturbance.   Respiratory:  Negative for shortness of breath.    Cardiovascular:  Negative for chest pain and palpitations.   Gastrointestinal:  Positive for abdominal distention and abdominal pain. Negative for blood in stool.        Refllux   Endocrine: Negative for cold intolerance and heat intolerance.   Genitourinary:  Negative for hematuria.   Musculoskeletal:  Negative for gait problem.   Skin:  Negative for color change.   Allergic/Immunologic: Negative for immunocompromised state.   Neurological:  Negative for weakness and light-headedness.   Hematological:  Negative for adenopathy.   Psychiatric/Behavioral:  Negative for sleep disturbance. The patient is not nervous/anxious.          ACTIVE PROBLEMS  Patient Active Problem List    Diagnosis     Encounter for screening for malignant neoplasm of colon [Z12.11]     Postprandial epigastric pain [R10.13]          PAST MEDICAL HISTORY  Past Medical History:   Diagnosis Date    GERD (gastroesophageal reflux disease)     Hypertension          SURGICAL HISTORY  Past Surgical History:   Procedure Laterality Date    COLONOSCOPY N/A 7/12/2024    Procedure: COLONOSCOPY;  Surgeon: Eliseo Moya MD;  Location: [facility];  Service: Gastroenterology;  Laterality: N/A;  Diverticulosis;    ENDOSCOPY N/A 7/12/2024    Procedure: " "ESOPHAGOGASTRODUODENOSCOPY WITH BIOPSY, dilatation;  Surgeon: Eliseo Moya MD;  Location: [facility] OR;  Service: Gastroenterology;  Laterality: N/A;  esophageal stricture- dilate to 18mm, hiatal hernia, gastritis- biopsy    HYSTERECTOMY      OTHER SURGICAL HISTORY      bone surgery         FAMILY HISTORY  Family History   Problem Relation Age of Onset    Diabetes Mother     Clotting disorder Mother     Malig Hyperthermia Neg Hx          SOCIAL HISTORY  Social History     Occupational History    Not on file   Tobacco Use    Smoking status: Never     Passive exposure: Never    Smokeless tobacco: Never    Tobacco comments:     NA   Vaping Use    Vaping status: Never Used   Substance and Sexual Activity    Alcohol use: No    Drug use: No    Sexual activity: Yes     Partners: Male         CURRENT MEDICATIONS    Current Outpatient Medications:     losartan (COZAAR) 50 MG tablet, Take 1 tablet by mouth Daily., Disp: , Rfl:     naproxen sodium (ALEVE) 220 MG tablet, Take 1 tablet by mouth As Needed., Disp: , Rfl:     Vonoprazan Fumarate (Voquezna) 20 MG tablet, Take 1 tablet by mouth Daily., Disp: 30 tablet, Rfl: 1    ALLERGIES  Penicillins    VITALS  Vitals:    08/23/24 0836   BP: 120/80   BP Location: Left arm   Patient Position: Sitting   Cuff Size: Large Adult   Weight: 78.5 kg (173 lb)   Height: 165.1 cm (65\")       PHYSICAL EXAM  Debilities/Disabilities Identified: None  Emotional Behavior: Appropriate  Wt Readings from Last 3 Encounters:   08/23/24 78.5 kg (173 lb)   07/12/24 76.5 kg (168 lb 9.6 oz)   05/31/24 76.3 kg (168 lb 3.2 oz)     Ht Readings from Last 1 Encounters:   08/23/24 165.1 cm (65\")     Body mass index is 28.79 kg/m².  Physical Exam  Constitutional:       General: She is not in acute distress.     Appearance: Normal appearance. She is not ill-appearing.   HENT:      Head: Normocephalic and atraumatic.      Mouth/Throat:      Mouth: Mucous membranes are moist.      Pharynx: No posterior " oropharyngeal erythema.   Eyes:      General: No scleral icterus.  Cardiovascular:      Rate and Rhythm: Normal rate and regular rhythm.      Heart sounds: Normal heart sounds.   Pulmonary:      Effort: Pulmonary effort is normal.      Breath sounds: Normal breath sounds.   Abdominal:      General: Abdomen is flat. Bowel sounds are normal. There is no distension.      Palpations: Abdomen is soft. There is no mass.      Tenderness: There is no abdominal tenderness in the epigastric area, periumbilical area and left upper quadrant. There is no guarding or rebound. Negative signs include Bashir's sign.      Hernia: No hernia is present.   Musculoskeletal:      Cervical back: Neck supple.   Skin:     General: Skin is warm.      Capillary Refill: Capillary refill takes less than 2 seconds.   Neurological:      General: No focal deficit present.      Mental Status: She is alert and oriented to person, place, and time.   Psychiatric:         Mood and Affect: Mood normal.         Behavior: Behavior normal.         Thought Content: Thought content normal.         Judgment: Judgment normal.         CLINICAL DATA REVIEWED   reviewed previous lab results and integrated with today's visit, reviewed notes from other physicians and/or last GI encounter, reviewed previous endoscopy results and available photos, reviewed surgical pathology results from previous biopsies    ASSESSMENT  Diagnoses and all orders for this visit:    Gastroesophageal reflux disease with esophagitis, unspecified whether hemorrhage  -     Discontinue: Vonoprazan Fumarate (Voquezna) 20 MG tablet; Take 1 tablet by mouth Daily.  -     Vonoprazan Fumarate (Voquezna) 20 MG tablet; Take 1 tablet by mouth Daily.    History of esophageal stricture    Elevated LFTs  -     Comprehensive Metabolic Panel    Generalized abdominal pain  -     NM Gastric Emptying; Future    Bloating  -     NM Gastric Emptying; Future    Other orders  -     losartan (COZAAR) 50 MG tablet;  Take 1 tablet by mouth Daily.          PLAN    Will start voquezna  Reviewed smaller meals more often  GES  Bloating: Ok to use gas tabs PRN  Will recheck LFTs today    Return in about 2 months (around 10/23/2024).    I have discussed the above plan with the patient.  They verbalize understanding and are in agreement with the plan.  They have been advised to contact the office for any questions, concerns, or changes related to their health.

## 2024-10-10 ENCOUNTER — TRANSCRIBE ORDERS (OUTPATIENT)
Dept: ADMINISTRATIVE | Facility: HOSPITAL | Age: 64
End: 2024-10-10
Payer: COMMERCIAL

## 2024-10-10 DIAGNOSIS — Z12.31 ENCOUNTER FOR SCREENING MAMMOGRAM FOR MALIGNANT NEOPLASM OF BREAST: Primary | ICD-10-CM

## 2024-11-21 ENCOUNTER — HOSPITAL ENCOUNTER (OUTPATIENT)
Dept: NUCLEAR MEDICINE | Facility: HOSPITAL | Age: 64
Discharge: HOME | End: 2024-11-21
Payer: COMMERCIAL

## 2024-11-21 ENCOUNTER — HOSPITAL ENCOUNTER (OUTPATIENT)
Dept: ULTRASOUND IMAGING | Facility: HOSPITAL | Age: 64
Discharge: HOME | End: 2024-11-21
Payer: COMMERCIAL

## 2024-11-21 DIAGNOSIS — R10.84 GENERALIZED ABDOMINAL PAIN: ICD-10-CM

## 2024-11-21 DIAGNOSIS — R14.0 BLOATING: ICD-10-CM

## 2024-11-21 DIAGNOSIS — R74.01 ELEVATED AST (SGOT): ICD-10-CM

## 2024-11-21 PROCEDURE — 34310000005 TECHNETIUM SULFUR COLLOID

## 2024-11-21 PROCEDURE — 76705 ECHO EXAM OF ABDOMEN: CPT

## 2024-11-21 PROCEDURE — A9541 TC99M SULFUR COLLOID: HCPCS

## 2024-11-21 PROCEDURE — 78264 GASTRIC EMPTYING IMG STUDY: CPT

## 2024-11-21 RX ADMIN — TECHNETIUM TC 99M SULFUR COLLOID 1 DOSE: KIT at 11:32

## 2024-12-04 ENCOUNTER — OFFICE VISIT (OUTPATIENT)
Dept: GASTROENTEROLOGY | Facility: CLINIC | Age: 64
End: 2024-12-04
Payer: COMMERCIAL

## 2024-12-04 VITALS
HEIGHT: 65 IN | DIASTOLIC BLOOD PRESSURE: 80 MMHG | BODY MASS INDEX: 28.76 KG/M2 | SYSTOLIC BLOOD PRESSURE: 142 MMHG | WEIGHT: 172.6 LBS

## 2024-12-04 DIAGNOSIS — K21.00 GASTROESOPHAGEAL REFLUX DISEASE WITH ESOPHAGITIS WITHOUT HEMORRHAGE: Primary | ICD-10-CM

## 2024-12-04 DIAGNOSIS — R14.0 BLOATING: ICD-10-CM

## 2024-12-04 DIAGNOSIS — K58.1 IRRITABLE BOWEL SYNDROME WITH CONSTIPATION: ICD-10-CM

## 2024-12-04 RX ORDER — ROSUVASTATIN CALCIUM 5 MG/1
5 TABLET, COATED ORAL DAILY
COMMUNITY

## 2024-12-04 RX ORDER — OMEPRAZOLE 40 MG/1
40 CAPSULE, DELAYED RELEASE ORAL DAILY
Qty: 90 CAPSULE | Refills: 3 | Status: SHIPPED | OUTPATIENT
Start: 2024-12-04

## 2024-12-04 RX ORDER — LOSARTAN POTASSIUM 25 MG/1
25 TABLET ORAL DAILY
COMMUNITY
Start: 2024-06-13

## 2024-12-04 NOTE — PROGRESS NOTES
PATIENT INFORMATION  Loreto Bundy       - 1960    CHIEF COMPLAINT  Chief Complaint   Patient presents with   • Heartburn   • Elevated Hepatic Enzymes   • Abdominal Pain   • Bloated       HISTORY OF PRESENT ILLNESS    Here today for GERD follow-up     GERD: 40 mg pantoprazole daily. Bloating and discomfort 30-45 min after she eats, feels like gas build, lasts until bedtime, and usually after last meal. Not eating much breakfast or lunch. Overeating for dinner is contributing. Reviewed avoiding irritants and NSAIDs, small meals more often. This does not seem to be a gallbladder issue. TODAY: started voquezna, but too expensive, so not on anything  now and epigastric pain and bloating happens right after eating and lasts until having BM which gives relief of discomfort and gas. No dysphagia. Ibuprofen for knee, encouraged to avoid and take tylenol.    Bowels are QOD and feels miserable until she goes, not taking anything for bowels, miralax a few times in past, no fiber and probiotic, no hard stools. Reviewed supportive care with fiber, fluids, exercise. Pt feels like she can be compliant with daily miralax.     LFTs mildly elevated in March, will recheck today.    GES: Normal, but patient responsible for 1600. US with fatty liver and large gallstone unlikely to cause sx.    2024 EGD and colon with stricture (dilated) mild reactive gastropathy, well-controlled reflux, no HP or barretts. Colonoscopy was normal, so 7 yr recall.     Questions GLP-1 for weight loss, encouraged to hold off until GI sx resolved or well controlled      REVIEWED PERTINENT RESULTS/ LABS  Lab Results   Component Value Date    CASEREPORT  2024     Surgical Pathology Report                         Case: [id number]                                  Authorizing Provider:  Eliseo Moya        Collected:           2024 08:52 AM                                 MD Gia                                                                    Ordering Location:     [facility]   Received:            07/12/2024 09:31 AM                                 OR                                                                           Pathologist:           Cristina Bowman MD                                                          Specimens:   1) - Stomach, gastric biopsy                                                                        2) - Esophagus, Distal, Distal Esophageal Biopsy                                           FINALDX  07/12/2024     1.  Stomach, biopsy: Antral type gastric mucosa with   A. Mild reactive/chemical gastropathy; no significant inflammation.   B. No metaplasia, dysplasia nor malignancy.   C. No H. pylori-like organisms identified.    2.  Esophagus, distal, biopsy: squamous mucosa with    A. No significant eosinophilia.   B. No goblet cell metaplasia, dysplasia nor malignancy.   C. No viral inclusions nor fungal organisms identified.       Lab Results   Component Value Date    ALT 26 08/23/2024    AST 39 (H) 08/23/2024      NM Gastric Emptying    Result Date: 11/21/2024  Narrative: NM GASTRIC EMPTYING Date of Exam: 11/21/2024 11:31 AM EST Indication: Abdominal pain, postprandial bloating. Comparison: None available. Technique: The patient was given a meal which included 522 uCi of technetium sulfur colloid.  Counts were obtained over the stomach in the anterior and posterior projection to evaluate gastric emptying. Findings: The T1 half for gastric emptying is 36 minutes. At 60 minutes, 37% remains in the stomach. At 120 minutes 11% remains in the stomach and at 182 minutes only 7% remains in the stomach. The stomach is completely emptied at 240 minutes.     Impression: Impression: Normal gastric emptying Electronically Signed: Matheus Dennis MD  11/21/2024 3:50 PM EST  Workstation ID: [id number]    US Liver    Result Date: 11/21/2024  Narrative: US LIVER Date of Exam: 11/21/2024 10:32 AM EST  Indication: elevated AST. Comparison: No comparisons available. Technique: Grayscale and color Doppler ultrasound evaluation of the right upper quadrant was performed. Findings: The pancreas appears normal. There is increased echogenicity within the liver suggesting hepatic steatosis. Normal blood flow is identified in the portal and hepatic veins. There are no focal liver lesions. There is a large stone within the gallbladder with posterior shadowing. There is no gallbladder wall thickening or para cholecystic fluid. There is no biliary dilatation. Right kidney has a maximal [id number]-sg-[id number] length of 10.4 cm and is sonographically normal.     Impression: Impression: 1. Diffuse hepatic steatosis. 2. Cholelithiasis Electronically Signed: Matheus Dennis MD  11/21/2024 1:58 PM EST  Workstation ID: [id number]     REVIEW OF SYSTEMS  Review of Systems   Constitutional:  Negative for activity change, chills, fever and unexpected weight change.   HENT:  Negative for congestion.    Eyes:  Negative for visual disturbance.   Respiratory:  Negative for shortness of breath.    Cardiovascular:  Negative for chest pain and palpitations.   Gastrointestinal:  Positive for abdominal distention. Negative for abdominal pain and blood in stool.        Reflux   Endocrine: Negative for cold intolerance and heat intolerance.   Genitourinary:  Negative for hematuria.   Musculoskeletal:  Negative for gait problem.   Skin:  Negative for color change.   Allergic/Immunologic: Negative for immunocompromised state.   Neurological:  Negative for weakness and light-headedness.   Hematological:  Negative for adenopathy.   Psychiatric/Behavioral:  Negative for sleep disturbance. The patient is not nervous/anxious.          ACTIVE PROBLEMS  Patient Active Problem List    Diagnosis    • Encounter for screening for malignant neoplasm of colon [Z12.11]    • Postprandial epigastric pain [R10.13]          PAST MEDICAL HISTORY  Past Medical History:   Diagnosis  "Date   • GERD (gastroesophageal reflux disease)    • Hypertension          SURGICAL HISTORY  Past Surgical History:   Procedure Laterality Date   • COLONOSCOPY N/A 7/12/2024    Procedure: COLONOSCOPY;  Surgeon: Eliseo Moya MD;  Location: [facility] OR;  Service: Gastroenterology;  Laterality: N/A;  Diverticulosis;   • ENDOSCOPY N/A 7/12/2024    Procedure: ESOPHAGOGASTRODUODENOSCOPY WITH BIOPSY, dilatation;  Surgeon: Eliseo Moya MD;  Location: [facility] OR;  Service: Gastroenterology;  Laterality: N/A;  esophageal stricture- dilate to 18mm, hiatal hernia, gastritis- biopsy   • HYSTERECTOMY     • OTHER SURGICAL HISTORY      bone surgery         FAMILY HISTORY  Family History   Problem Relation Age of Onset   • Diabetes Mother    • Clotting disorder Mother    • Malig Hyperthermia Neg Hx          SOCIAL HISTORY  Social History     Occupational History   • Not on file   Tobacco Use   • Smoking status: Never     Passive exposure: Never   • Smokeless tobacco: Never   • Tobacco comments:     NA   Vaping Use   • Vaping status: Never Used   Substance and Sexual Activity   • Alcohol use: No   • Drug use: No   • Sexual activity: Yes     Partners: Male         CURRENT MEDICATIONS    Current Outpatient Medications:   •  losartan (COZAAR) 25 MG tablet, Take 1 tablet by mouth Daily., Disp: , Rfl:   •  rosuvastatin (CRESTOR) 5 MG tablet, Take 1 tablet by mouth Daily., Disp: , Rfl:   •  omeprazole (priLOSEC) 40 MG capsule, Take 1 capsule by mouth Daily., Disp: 90 capsule, Rfl: 3    ALLERGIES  Penicillins    VITALS  Vitals:    12/04/24 1052   BP: 142/80   BP Location: Left arm   Patient Position: Sitting   Cuff Size: Adult   Weight: 78.3 kg (172 lb 9.6 oz)   Height: 165.1 cm (65\")       PHYSICAL EXAM  Debilities/Disabilities Identified: None  Emotional Behavior: Appropriate  Wt Readings from Last 3 Encounters:   12/04/24 78.3 kg (172 lb 9.6 oz)   08/23/24 78.5 kg (173 lb)   07/12/24 76.5 kg (168 lb 9.6 oz) " "    Ht Readings from Last 1 Encounters:   12/04/24 165.1 cm (65\")     Body mass index is 28.72 kg/m².  Physical Exam  Constitutional:       General: She is not in acute distress.     Appearance: Normal appearance. She is not ill-appearing.   HENT:      Head: Normocephalic and atraumatic.      Mouth/Throat:      Mouth: Mucous membranes are moist.      Pharynx: No posterior oropharyngeal erythema.   Eyes:      General: No scleral icterus.  Cardiovascular:      Rate and Rhythm: Normal rate and regular rhythm.      Heart sounds: Normal heart sounds.   Pulmonary:      Effort: Pulmonary effort is normal.      Breath sounds: Normal breath sounds.   Abdominal:      General: Abdomen is flat. Bowel sounds are normal. There is no distension.      Palpations: Abdomen is soft. There is no mass.      Tenderness: There is no abdominal tenderness in the right upper quadrant, right lower quadrant, epigastric area, periumbilical area, left upper quadrant and left lower quadrant. There is no guarding or rebound. Negative signs include Bashir's sign.      Hernia: No hernia is present.   Musculoskeletal:      Cervical back: Neck supple.   Skin:     General: Skin is warm.      Capillary Refill: Capillary refill takes less than 2 seconds.   Neurological:      General: No focal deficit present.      Mental Status: She is alert and oriented to person, place, and time.   Psychiatric:         Mood and Affect: Mood normal.         Behavior: Behavior normal.         Thought Content: Thought content normal.         Judgment: Judgment normal.       CLINICAL DATA REVIEWED   reviewed previous lab results and integrated with today's visit, reviewed notes from other physicians and/or last GI encounter, reviewed previous endoscopy results and available photos, reviewed surgical pathology results from previous biopsies    ASSESSMENT  Diagnoses and all orders for this visit:    Gastroesophageal reflux disease with esophagitis without " hemorrhage    Irritable bowel syndrome with constipation    Bloating    Other orders  -     rosuvastatin (CRESTOR) 5 MG tablet; Take 1 tablet by mouth Daily.  -     losartan (COZAAR) 25 MG tablet; Take 1 tablet by mouth Daily.  -     omeprazole (priLOSEC) 40 MG capsule; Take 1 capsule by mouth Daily.          PLAN    IBS-C: Supportive care, daily miralax,  call if any issues or concerns  GERD: Resume daily PPI    Return in about 3 months (around 3/4/2025).    I have discussed the above plan with the patient.  They verbalize understanding and are in agreement with the plan.  They have been advised to contact the office for any questions, concerns, or changes related to their health.

## 2024-12-20 PROBLEM — R74.8 ELEVATED LIVER ENZYMES: Status: ACTIVE | Noted: 2024-03-21

## 2024-12-20 PROBLEM — R10.9 ACUTE ABDOMINAL PAIN: Status: ACTIVE | Noted: 2024-10-09

## 2024-12-20 PROBLEM — I10 BENIGN ESSENTIAL HYPERTENSION: Status: ACTIVE | Noted: 2024-04-18

## 2024-12-20 PROBLEM — K21.9 GASTROESOPHAGEAL REFLUX DISEASE WITHOUT ESOPHAGITIS: Status: ACTIVE | Noted: 2024-03-19

## 2024-12-20 PROBLEM — R03.0 ELEVATED BLOOD-PRESSURE READING WITHOUT DIAGNOSIS OF HYPERTENSION: Status: ACTIVE | Noted: 2024-03-19

## 2024-12-20 PROBLEM — R89.9 ABNORMAL LABORATORY TEST RESULT: Status: ACTIVE | Noted: 2024-10-11

## 2024-12-20 PROBLEM — K44.9 HIATAL HERNIA WITH GASTROESOPHAGEAL REFLUX: Status: ACTIVE | Noted: 2024-07-17

## 2024-12-20 PROBLEM — E78.2 MIXED HYPERLIPIDEMIA: Status: ACTIVE | Noted: 2024-03-21

## 2024-12-20 PROBLEM — R73.03 PREDIABETES: Status: ACTIVE | Noted: 2024-03-21

## 2024-12-20 PROBLEM — R94.5 ABNORMAL LIVER FUNCTION: Status: ACTIVE | Noted: 2024-10-11

## 2024-12-20 PROBLEM — K21.9 HIATAL HERNIA WITH GASTROESOPHAGEAL REFLUX: Status: ACTIVE | Noted: 2024-07-17

## 2024-12-20 PROBLEM — R13.19 ESOPHAGEAL DYSPHAGIA: Status: ACTIVE | Noted: 2024-03-19

## 2025-01-14 ENCOUNTER — OFFICE VISIT (OUTPATIENT)
Dept: ORTHOPEDIC SURGERY | Facility: CLINIC | Age: 65
End: 2025-01-14
Payer: COMMERCIAL

## 2025-01-14 VITALS
BODY MASS INDEX: 28.16 KG/M2 | DIASTOLIC BLOOD PRESSURE: 81 MMHG | HEIGHT: 65 IN | HEART RATE: 77 BPM | WEIGHT: 169 LBS | SYSTOLIC BLOOD PRESSURE: 175 MMHG

## 2025-01-14 DIAGNOSIS — M17.11 PRIMARY OSTEOARTHRITIS OF RIGHT KNEE: ICD-10-CM

## 2025-01-14 DIAGNOSIS — Q78.6 MULTIPLE HEREDITARY EXOSTOSES: Primary | ICD-10-CM

## 2025-01-14 RX ORDER — MELOXICAM 15 MG/1
15 TABLET ORAL DAILY
Qty: 30 TABLET | Refills: 2 | Status: SHIPPED | OUTPATIENT
Start: 2025-01-14

## 2025-01-14 NOTE — PROGRESS NOTES
"Subjective:     Patient ID: Loreto Bundy is a 64 y.o. female.    Chief Complaint:    History of Present Illness  Loreto Bundy presents to clinic today for evaluation of right knee pain.  The patient states the knee pain is located predominantly anteriorly and anterior medially in the knee and is worse with activity and better with rest.  She states that she has had countless surgeries on bilateral lower extremities for osteochondromas in the past.  Her last surgery was in 1980.  She states that the knee pain is different this time.  She denies any new growths and she states that all of her osteochondromas are removed in the past were all benign.     Social History     Occupational History    Not on file   Tobacco Use    Smoking status: Never     Passive exposure: Never    Smokeless tobacco: Never    Tobacco comments:     NA   Vaping Use    Vaping status: Never Used   Substance and Sexual Activity    Alcohol use: No    Drug use: No    Sexual activity: Yes     Partners: Male      Past Medical History:   Diagnosis Date    GERD (gastroesophageal reflux disease)     Hypertension      Past Surgical History:   Procedure Laterality Date    COLONOSCOPY N/A 7/12/2024    Procedure: COLONOSCOPY;  Surgeon: Eliseo Moya MD;  Location: [facility];  Service: Gastroenterology;  Laterality: N/A;  Diverticulosis;    ENDOSCOPY N/A 7/12/2024    Procedure: ESOPHAGOGASTRODUODENOSCOPY WITH BIOPSY, dilatation;  Surgeon: Eliseo Moya MD;  Location: [facility];  Service: Gastroenterology;  Laterality: N/A;  esophageal stricture- dilate to 18mm, hiatal hernia, gastritis- biopsy    HYSTERECTOMY      OTHER SURGICAL HISTORY      bone surgery       Family History   Problem Relation Age of Onset    Diabetes Mother     Clotting disorder Mother     Malig Hyperthermia Neg Hx                  Objective:  Vitals:    01/14/25 0924   BP: 175/81   Pulse: 77   Weight: 76.7 kg (169 lb)   Height: 165.1 cm (65\")         " 01/14/25  0924   Weight: 76.7 kg (169 lb)     Body mass index is 28.12 kg/m².           Right Knee Exam     Tenderness   The patient is experiencing tenderness in the medial retinaculum and medial joint line.    Range of Motion   Extension:  0   Flexion:  120     Tests   Nya:  Medial - positive Lateral - negative  Varus: negative Valgus: negative  Lachman:  Anterior - negative    Posterior - negative  Drawer:  Anterior - negative    Posterior - negative    Other   Erythema: absent  Scars: present  Sensation: normal  Pulse: present  Swelling: mild  Effusion: no effusion present               Imaging:     XR KNEE 4+ VW RIGHT-     INDICATIONS: Trauma.     TECHNIQUE: 3 VIEWS OF THE RIGHT KNEE     COMPARISON: None available     FINDINGS:     No acute fracture, erosion, or dislocation is identified. An osseous  excrescence of the distal femoral metaphysis, and another at the  proximal tibial metaphysis show corticomedullary continuity, compatible  with osteochondromas. 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 The  frontal view is technically limited by overpenetration. No acute  fracture or erosion is identified within this limitation. Mild  degenerative spurring is noted at the knee. Moderate medial tibiofemoral  joint space narrowing is seen. Follow-up/further evaluation can be  obtained as indications persist.     IMPRESSION:     As described.           This report was finalized on 12/20/2024 10:47 AM by Dr. Levon Ervin M.D on Workstation: [id number]      Imaging: 3 views of the right knee were reviewed by myself in the office today  Indication: right knee pain  Findings: X-rays demonstrate no acute osseous abnormality.  There is no signs of fracture dislocation or subluxation.  There is  severe  Kellgren and Enrrique grade 3joint space narrowing, subchondral sclerosis, cystic changes, and periarticular osteophytes most pronounced in the Medial joint.  The patient has signs of prior surgery and multiple  stumps and stocks of osteochondromas noted.  Comparative studies: None      Assessment:        1. Multiple hereditary exostoses    2. Primary osteoarthritis of right knee           Plan:          Discussed treatment options at length with patient at today's visit.  I discussed with the patient that she likely has MHE.  I discussed with her that the issue that she is having now is osteoarthritis of her right knee predominantly in her patellofemoral and medial joint.  I discussed with the patient that the mainstays of conservative treatment for knee OA include physical therapy, nonsteroidal anti-inflammatories, injections, activity modification, and home exercises.  The patient states that they  would like to try prescription for meloxicam 15 mg p.o. daily and continuation of her over-the-counter knee brace.  At this point time the patient does not need to schedule follow-up with me today.  I am happy to see the patient back at any point time however if issues arise or they fail to make a full recovery.  Follow up: As needed with 4 standing views of the right knee      Loreto Bundy was in agreement with plan and had all questions answered.     Medications:  New Medications Ordered This Visit   Medications    meloxicam (MOBIC) 15 MG tablet     Sig: Take 1 tablet by mouth Daily.     Dispense:  30 tablet     Refill:  2       Followup:  No follow-ups on file.    Diagnoses and all orders for this visit:    1. Multiple hereditary exostoses (Primary)    2. Primary osteoarthritis of right knee    Other orders  -     meloxicam (MOBIC) 15 MG tablet; Take 1 tablet by mouth Daily.  Dispense: 30 tablet; Refill: 2          Dictated utilizing Dragon dictation

## 2025-01-15 ENCOUNTER — PATIENT ROUNDING (BHMG ONLY) (OUTPATIENT)
Dept: ORTHOPEDIC SURGERY | Facility: CLINIC | Age: 65
End: 2025-01-15
Payer: COMMERCIAL

## 2025-01-15 NOTE — PROGRESS NOTES
A My-Chart message has been sent to the patient for PATIENT ROUNDING with [facility].

## 2025-01-27 ENCOUNTER — HOSPITAL ENCOUNTER (OUTPATIENT)
Dept: MAMMOGRAPHY | Facility: HOSPITAL | Age: 65
Discharge: HOME | End: 2025-01-27
Admitting: NURSE PRACTITIONER
Payer: COMMERCIAL

## 2025-01-27 DIAGNOSIS — Z12.31 ENCOUNTER FOR SCREENING MAMMOGRAM FOR MALIGNANT NEOPLASM OF BREAST: ICD-10-CM

## 2025-01-27 PROCEDURE — 77063 BREAST TOMOSYNTHESIS BI: CPT | Performed by: RADIOLOGY

## 2025-01-27 PROCEDURE — 77067 SCR MAMMO BI INCL CAD: CPT

## 2025-01-27 PROCEDURE — 77067 SCR MAMMO BI INCL CAD: CPT | Performed by: RADIOLOGY

## 2025-01-27 PROCEDURE — 77063 BREAST TOMOSYNTHESIS BI: CPT

## 2025-03-04 ENCOUNTER — OFFICE VISIT (OUTPATIENT)
Dept: GASTROENTEROLOGY | Facility: CLINIC | Age: 65
End: 2025-03-04
Payer: COMMERCIAL

## 2025-03-04 VITALS
HEIGHT: 65 IN | BODY MASS INDEX: 27.72 KG/M2 | WEIGHT: 166.4 LBS | SYSTOLIC BLOOD PRESSURE: 140 MMHG | DIASTOLIC BLOOD PRESSURE: 82 MMHG

## 2025-03-04 DIAGNOSIS — K58.1 IRRITABLE BOWEL SYNDROME WITH CONSTIPATION: ICD-10-CM

## 2025-03-04 DIAGNOSIS — K21.9 GASTROESOPHAGEAL REFLUX DISEASE WITHOUT ESOPHAGITIS: Primary | ICD-10-CM

## 2025-03-04 RX ORDER — POLYETHYLENE GLYCOL 3350 17 G/17G
17 POWDER, FOR SOLUTION ORAL DAILY
COMMUNITY

## 2025-03-04 RX ORDER — METFORMIN HYDROCHLORIDE 500 MG/1
500 TABLET, EXTENDED RELEASE ORAL
COMMUNITY

## 2025-03-04 RX ORDER — ACETAMINOPHEN 160 MG
1 TABLET,DISINTEGRATING ORAL DAILY
COMMUNITY
Start: 2025-02-25

## 2025-03-04 RX ORDER — OMEPRAZOLE 40 MG/1
40 CAPSULE, DELAYED RELEASE ORAL DAILY
Qty: 90 CAPSULE | Refills: 3 | Status: SHIPPED | OUTPATIENT
Start: 2025-03-04

## 2025-03-04 NOTE — PROGRESS NOTES
PATIENT INFORMATION  Loreto Bundy       - 1960    CHIEF COMPLAINT  Chief Complaint   Patient presents with   • Follow-up     GERD; IBS-C       HISTORY OF PRESENT ILLNESS    Here today for GERD and IBS-C follow-up     GERD: Per LOV: started voquezna, but too expensive, so not on anything  now and epigastric pain and bloating happens right after eating and lasts until having BM which gives relief of discomfort and gas. No dysphagia. Ibuprofen for knee, encouraged to avoid and take tylenol. We resumed 40 mg omeprazole daily. TODAY: Omeprazole daily since LOV and doing well and no epigastric pain or bloating unless she overdoes it. Feeling well controlled.     IBS-c: Per LOV: Bowels are QOD and feels miserable until she goes, not taking anything for bowels, miralax a few times in past, no fiber and probiotic, no hard stools. Reviewed supportive care with fiber, fluids, exercise. Pt feels like she can be compliant with daily miralax. TODAY: Doing well on daily miralax and bowels are moving 2-3 times a day and easy and well controlled.     AST with mild persistent elevation, US consistent with hepatic steatosis. GGT normal. Reviewed with patient AST     GES: Normal, but patient responsible for 1600. US with fatty liver and large gallstone unlikely to cause sx.     2024 EGD and colon with stricture (dilated) mild reactive gastropathy, well-controlled reflux, no HP or barretts. Colonoscopy was normal, so 7 yr recall.      Questions GLP-1 for weight loss, encouraged to hold off until GI sx resolved or well controlled        REVIEWED PERTINENT RESULTS/ LABS  Lab Results   Component Value Date    CASEREPORT  2024     Surgical Pathology Report                         Case: [id number]                                  Authorizing Provider:  Eliseo Moya        Collected:           2024 08:52 AM                                 MD Gia                                                                    Ordering Location:     [facility]   Received:            07/12/2024 09:31 AM                                 OR                                                                           Pathologist:           Cristina Bowman MD                                                          Specimens:   1) - Stomach, gastric biopsy                                                                        2) - Esophagus, Distal, Distal Esophageal Biopsy                                           FINALDX  07/12/2024     1.  Stomach, biopsy: Antral type gastric mucosa with   A. Mild reactive/chemical gastropathy; no significant inflammation.   B. No metaplasia, dysplasia nor malignancy.   C. No H. pylori-like organisms identified.    2.  Esophagus, distal, biopsy: squamous mucosa with    A. No significant eosinophilia.   B. No goblet cell metaplasia, dysplasia nor malignancy.   C. No viral inclusions nor fungal organisms identified.       Lab Results   Component Value Date    ALT 26 08/23/2024    AST 39 (H) 08/23/2024      No results found.    REVIEW OF SYSTEMS  Review of Systems      ACTIVE PROBLEMS  Patient Active Problem List    Diagnosis    • Abnormal laboratory test result [R89.9]    • Abnormal liver function [R94.5]    • Acute abdominal pain [R10.9]    • Hiatal hernia with gastroesophageal reflux [K44.9, K21.9]    • Encounter for screening for malignant neoplasm of colon [Z12.11]    • Postprandial epigastric pain [R10.13]    • Benign essential hypertension [I10]    • Elevated liver enzymes [R74.8]    • Mixed hyperlipidemia [E78.2]    • Prediabetes [R73.03]    • Elevated blood-pressure reading without diagnosis of hypertension [R03.0]    • Esophageal dysphagia [R13.19]    • Gastroesophageal reflux disease without esophagitis [K21.9]    • Right foot pain [M79.671]    • Right foot strain [S96.911A]    • De Quervain's tenosynovitis [M65.4]          PAST MEDICAL HISTORY  Past Medical History:   Diagnosis  Date   • GERD (gastroesophageal reflux disease)    • Hypertension          SURGICAL HISTORY  Past Surgical History:   Procedure Laterality Date   • COLONOSCOPY N/A 7/12/2024    Procedure: COLONOSCOPY;  Surgeon: Eliseo Moya MD;  Location: [facility] OR;  Service: Gastroenterology;  Laterality: N/A;  Diverticulosis;   • ENDOSCOPY N/A 7/12/2024    Procedure: ESOPHAGOGASTRODUODENOSCOPY WITH BIOPSY, dilatation;  Surgeon: Eliseo Moya MD;  Location: [facility] OR;  Service: Gastroenterology;  Laterality: N/A;  esophageal stricture- dilate to 18mm, hiatal hernia, gastritis- biopsy   • HYSTERECTOMY     • OTHER SURGICAL HISTORY      bone surgery         FAMILY HISTORY  Family History   Problem Relation Age of Onset   • Diabetes Mother    • Clotting disorder Mother    • Malig Hyperthermia Neg Hx    • Breast cancer Neg Hx          SOCIAL HISTORY  Social History     Occupational History   • Not on file   Tobacco Use   • Smoking status: Never     Passive exposure: Never   • Smokeless tobacco: Never   • Tobacco comments:     NA   Vaping Use   • Vaping status: Never Used   Substance and Sexual Activity   • Alcohol use: No   • Drug use: No   • Sexual activity: Yes     Partners: Male         CURRENT MEDICATIONS    Current Outpatient Medications:   •  Cholecalciferol (Vitamin D3) 50 MCG (2000 UT) capsule, Take 1 capsule by mouth Daily., Disp: , Rfl:   •  losartan (COZAAR) 50 MG tablet, Take 1 tablet by mouth Daily., Disp: , Rfl:   •  metFORMIN ER (GLUCOPHAGE-XR) 500 MG 24 hr tablet, Take 1 tablet by mouth Daily With Breakfast., Disp: , Rfl:   •  omeprazole (priLOSEC) 40 MG capsule, Take 1 capsule by mouth Daily., Disp: 90 capsule, Rfl: 3  •  polyethylene glycol (MiraLax) 17 GM/SCOOP powder, Take 17 g by mouth Daily., Disp: , Rfl:   •  rosuvastatin (CRESTOR) 5 MG tablet, Take 1 tablet by mouth Daily., Disp: , Rfl:   •  meloxicam (MOBIC) 15 MG tablet, Take 1 tablet by mouth Daily. (Patient not taking: Reported on  "3/4/2025), Disp: 30 tablet, Rfl: 2    ALLERGIES  Penicillins    VITALS  Vitals:    03/04/25 1112   BP: 140/82   BP Location: Left arm   Patient Position: Sitting   Cuff Size: Adult   Weight: 75.5 kg (166 lb 6.4 oz)   Height: 165.1 cm (65\")       PHYSICAL EXAM  Debilities/Disabilities Identified: None  Emotional Behavior: Appropriate  Wt Readings from Last 3 Encounters:   03/04/25 75.5 kg (166 lb 6.4 oz)   01/14/25 76.7 kg (169 lb)   12/20/24 76.7 kg (169 lb)     Ht Readings from Last 1 Encounters:   03/04/25 165.1 cm (65\")     Body mass index is 27.69 kg/m².  Physical Exam  Constitutional:       General: She is not in acute distress.     Appearance: Normal appearance. She is not ill-appearing.   HENT:      Head: Normocephalic and atraumatic.      Mouth/Throat:      Mouth: Mucous membranes are moist.      Pharynx: No posterior oropharyngeal erythema.   Eyes:      General: No scleral icterus.  Cardiovascular:      Rate and Rhythm: Normal rate and regular rhythm.      Heart sounds: Normal heart sounds.   Pulmonary:      Effort: Pulmonary effort is normal.      Breath sounds: Normal breath sounds.   Abdominal:      General: Abdomen is flat. Bowel sounds are normal. There is no distension.      Palpations: Abdomen is soft. There is no mass.      Tenderness: There is no abdominal tenderness. There is no guarding or rebound. Negative signs include Bashir's sign.      Hernia: No hernia is present.   Musculoskeletal:      Cervical back: Neck supple.   Skin:     General: Skin is warm.      Capillary Refill: Capillary refill takes less than 2 seconds.   Neurological:      General: No focal deficit present.      Mental Status: She is alert and oriented to person, place, and time.   Psychiatric:         Mood and Affect: Mood normal.         Behavior: Behavior normal.         Thought Content: Thought content normal.         Judgment: Judgment normal.       CLINICAL DATA REVIEWED   reviewed previous lab results and integrated with " today's visit, reviewed notes from other physicians and/or last GI encounter, reviewed previous endoscopy results and available photos, reviewed surgical pathology results from previous biopsies    ASSESSMENT  Diagnoses and all orders for this visit:    Gastroesophageal reflux disease without esophagitis    Irritable bowel syndrome with constipation    Other orders  -     metFORMIN ER (GLUCOPHAGE-XR) 500 MG 24 hr tablet; Take 1 tablet by mouth Daily With Breakfast.  -     Cholecalciferol (Vitamin D3) 50 MCG (2000 UT) capsule; Take 1 capsule by mouth Daily.  -     polyethylene glycol (MiraLax) 17 GM/SCOOP powder; Take 17 g by mouth Daily.  -     omeprazole (priLOSEC) 40 MG capsule; Take 1 capsule by mouth Daily.          PLAN    GERD: Continue daily omeprazole  IBS-C: Continue daily miralax  Reviewed measures to increase toleration of GLP if she decided to pursue    Return in about 3 months (around 6/4/2025).    I have discussed the above plan with the patient.  They verbalize understanding and are in agreement with the plan.  They have been advised to contact the office for any questions, concerns, or changes related to their health.

## 2025-03-07 ENCOUNTER — TELEPHONE (OUTPATIENT)
Dept: GASTROENTEROLOGY | Facility: CLINIC | Age: 65
End: 2025-03-07
Payer: COMMERCIAL

## 2025-07-07 ENCOUNTER — OFFICE VISIT (OUTPATIENT)
Dept: GASTROENTEROLOGY | Facility: CLINIC | Age: 65
End: 2025-07-07
Payer: COMMERCIAL

## 2025-07-07 VITALS
BODY MASS INDEX: 28.32 KG/M2 | DIASTOLIC BLOOD PRESSURE: 80 MMHG | HEIGHT: 65 IN | SYSTOLIC BLOOD PRESSURE: 120 MMHG | WEIGHT: 170 LBS

## 2025-07-07 DIAGNOSIS — K58.1 IRRITABLE BOWEL SYNDROME WITH CONSTIPATION: ICD-10-CM

## 2025-07-07 DIAGNOSIS — K21.9 GASTROESOPHAGEAL REFLUX DISEASE WITHOUT ESOPHAGITIS: Primary | ICD-10-CM

## 2025-07-07 NOTE — PROGRESS NOTES
PATIENT INFORMATION  Loreto Bundy       - 1960    CHIEF COMPLAINT  Chief Complaint   Patient presents with    Heartburn    Irritable Bowel Syndrome    Constipation       HISTORY OF PRESENT ILLNESS  Here today for GERD and IBS-C follow-up     GERD: Doing well on daily omeprazole. She realizes she was overeating because now she feel bad when she does.      IBS-c: Doing ok on miralax as needed maybe once every 2-3 weeks, otherwise moving daily and easy and well controlled.      AST with mild persistent elevation, US consistent with hepatic steatosis. GGT normal. Reviewed with patient AST     Normal GES. US with fatty liver and large gallstone unlikely to cause sx.     2024 EGD and colon with stricture (dilated) mild reactive gastropathy, well-controlled reflux, no HP or barretts. Colonoscopy was normal, so 7 yr recall.     Labs next month        REVIEWED PERTINENT RESULTS/ LABS  Lab Results   Component Value Date    CASEREPORT  2024     Surgical Pathology Report                         Case: [id number]                                  Authorizing Provider:  Eliseo Moya        Collected:           2024 08:52 AM                                 MD Gia                                                                   Ordering Location:     [facility]   Received:            2024 09:31 AM                                 OR                                                                           Pathologist:           Cristina Bowman MD                                                          Specimens:   1) - Stomach, gastric biopsy                                                                        2) - Esophagus, Distal, Distal Esophageal Biopsy                                           FINALDX  2024     1.  Stomach, biopsy: Antral type gastric mucosa with   A. Mild reactive/chemical gastropathy; no significant inflammation.   B. No metaplasia,  dysplasia nor malignancy.   C. No H. pylori-like organisms identified.    2.  Esophagus, distal, biopsy: squamous mucosa with    A. No significant eosinophilia.   B. No goblet cell metaplasia, dysplasia nor malignancy.   C. No viral inclusions nor fungal organisms identified.       Lab Results   Component Value Date    ALT 26 08/23/2024    AST 39 (H) 08/23/2024      No results found.    REVIEW OF SYSTEMS  Review of Systems      ACTIVE PROBLEMS  Patient Active Problem List    Diagnosis     Abnormal laboratory test result [R89.9]     Abnormal liver function [R94.5]     Acute abdominal pain [R10.9]     Hiatal hernia with gastroesophageal reflux [K44.9, K21.9]     Encounter for screening for malignant neoplasm of colon [Z12.11]     Postprandial epigastric pain [R10.13]     Benign essential hypertension [I10]     Elevated liver enzymes [R74.8]     Mixed hyperlipidemia [E78.2]     Prediabetes [R73.03]     Elevated blood-pressure reading without diagnosis of hypertension [R03.0]     Esophageal dysphagia [R13.19]     Gastroesophageal reflux disease without esophagitis [K21.9]     Right foot pain [M79.671]     Right foot strain [S96.911A]     De Quervain's tenosynovitis [M65.4]          PAST MEDICAL HISTORY  Past Medical History:   Diagnosis Date    Cholelithiasis     GERD (gastroesophageal reflux disease)     Hyperlipidemia     Hypertension          SURGICAL HISTORY  Past Surgical History:   Procedure Laterality Date    COLONOSCOPY N/A 07/12/2024    Procedure: COLONOSCOPY;  Surgeon: Eliseo Moya MD;  Location: [facility];  Service: Gastroenterology;  Laterality: N/A;  Diverticulosis;    ENDOSCOPY N/A 07/12/2024    Procedure: ESOPHAGOGASTRODUODENOSCOPY WITH BIOPSY, dilatation;  Surgeon: Eliseo Moya MD;  Location: [facility] OR;  Service: Gastroenterology;  Laterality: N/A;  esophageal stricture- dilate to 18mm, hiatal hernia, gastritis- biopsy    HYSTERECTOMY      OTHER SURGICAL HISTORY      bone  "surgery    UPPER GASTROINTESTINAL ENDOSCOPY  7/12/2024         FAMILY HISTORY  Family History   Problem Relation Age of Onset    Diabetes Mother     Clotting disorder Mother     Malig Hyperthermia Neg Hx     Breast cancer Neg Hx          SOCIAL HISTORY  Social History     Occupational History    Not on file   Tobacco Use    Smoking status: Never     Passive exposure: Never    Smokeless tobacco: Never    Tobacco comments:     NA   Vaping Use    Vaping status: Never Used   Substance and Sexual Activity    Alcohol use: No    Drug use: No    Sexual activity: Yes     Partners: Male     Birth control/protection: Hysterectomy         CURRENT MEDICATIONS    Current Outpatient Medications:     losartan (COZAAR) 50 MG tablet, Take 1 tablet by mouth Daily., Disp: , Rfl:     metFORMIN ER (GLUCOPHAGE-XR) 500 MG 24 hr tablet, Take 1 tablet by mouth Daily With Breakfast., Disp: , Rfl:     omeprazole (priLOSEC) 40 MG capsule, Take 1 capsule by mouth Daily., Disp: 90 capsule, Rfl: 3    polyethylene glycol (MiraLax) 17 GM/SCOOP powder, Take 17 g by mouth Daily. (Patient taking differently: Take 17 g by mouth As Needed.), Disp: , Rfl:     rosuvastatin (CRESTOR) 5 MG tablet, Take 1 tablet by mouth Daily., Disp: , Rfl:     ALLERGIES  Penicillins    VITALS  Vitals:    07/07/25 1120   BP: 120/80   Weight: 77.1 kg (170 lb)   Height: 165.1 cm (65\")       PHYSICAL EXAM  Debilities/Disabilities Identified: None  Emotional Behavior: Appropriate  Wt Readings from Last 3 Encounters:   07/07/25 77.1 kg (170 lb)   03/04/25 75.5 kg (166 lb 6.4 oz)   01/14/25 76.7 kg (169 lb)     Ht Readings from Last 1 Encounters:   07/07/25 165.1 cm (65\")     Body mass index is 28.29 kg/m².  Physical Exam  Constitutional:       General: She is not in acute distress.     Appearance: Normal appearance. She is not ill-appearing.   HENT:      Head: Normocephalic and atraumatic.      Mouth/Throat:      Mouth: Mucous membranes are moist.      Pharynx: No posterior " oropharyngeal erythema.   Eyes:      General: No scleral icterus.  Cardiovascular:      Rate and Rhythm: Normal rate and regular rhythm.      Heart sounds: Normal heart sounds.   Pulmonary:      Effort: Pulmonary effort is normal.      Breath sounds: Normal breath sounds.   Abdominal:      General: Abdomen is flat. Bowel sounds are normal. There is no distension.      Palpations: Abdomen is soft. There is no mass.      Tenderness: There is abdominal tenderness in the epigastric area. There is no guarding or rebound. Negative signs include Bashir's sign.      Hernia: No hernia is present.   Musculoskeletal:      Cervical back: Neck supple.   Skin:     General: Skin is warm.      Capillary Refill: Capillary refill takes less than 2 seconds.   Neurological:      General: No focal deficit present.      Mental Status: She is alert and oriented to person, place, and time.   Psychiatric:         Mood and Affect: Mood normal.         Behavior: Behavior normal.         Thought Content: Thought content normal.         Judgment: Judgment normal.       CLINICAL DATA REVIEWED   reviewed previous lab results and integrated with today's visit, reviewed notes from other physicians and/or last GI encounter, reviewed previous endoscopy results and available photos, reviewed surgical pathology results from previous biopsies    ASSESSMENT  Diagnoses and all orders for this visit:    Gastroesophageal reflux disease without esophagitis    Irritable bowel syndrome with constipation          PLAN    GERD: Continue daily PPI  IBS-C: Ok to continue PRN miralax, but would resume daily dosing should constipation, bloating, discomfort resume  Reviewed LFTs with patient, labs next month, as long as no marked abnormality, will follow-up in 6 mos    Return in about 6 months (around 1/7/2026).    I have discussed the above plan with the patient.  They verbalize understanding and are in agreement with the plan.  They have been advised to contact the  office for any questions, concerns, or changes related to their health.

## (undated) DEVICE — Device

## (undated) DEVICE — SOL IRR H2O BTL 1000ML STRL

## (undated) DEVICE — SYR LL W/SCALE/MARK 3ML STRL

## (undated) DEVICE — VIAL FORMALIN CAP 10P 40ML

## (undated) DEVICE — KT ORCA ORCAPOD DISP STRL

## (undated) DEVICE — DEV INFL BALN BIG60 W/GAUGE 60ML

## (undated) DEVICE — ESOPHAGEAL/PYLORIC/COLONIC WIREGUIDED BALLOON DILATATION CATHETER: Brand: CRE WIREGUIDED

## (undated) DEVICE — BW-412T DISP COMBO CLEANING BRUSH: Brand: SINGLE USE COMBINATION CLEANING BRUSH

## (undated) DEVICE — FRCP BX RADJAW4 NDL 2.8 240CM LG OG BX40

## (undated) DEVICE — LINER SURG CANSTR SXN S/RIGD 1500CC

## (undated) DEVICE — JACKT LAB F/R KNIT CUFF/COLR XLG BLU

## (undated) DEVICE — THE BITE BLOCK MAXI, LATEX FREE STRAP IS USED TO PROTECT THE ENDOSCOPE INSERTION TUBE FROM BEING BITTEN BY THE PATIENT.

## (undated) DEVICE — GLV SURG SENSICARE PI MIC PF SZ8 LF STRL

## (undated) DEVICE — ADAPT CLN BIOGUARD AIR/H2O DISP